# Patient Record
Sex: FEMALE | Race: OTHER | NOT HISPANIC OR LATINO | URBAN - METROPOLITAN AREA
[De-identification: names, ages, dates, MRNs, and addresses within clinical notes are randomized per-mention and may not be internally consistent; named-entity substitution may affect disease eponyms.]

---

## 2018-08-06 ENCOUNTER — EMERGENCY (EMERGENCY)
Facility: HOSPITAL | Age: 33
LOS: 1 days | Discharge: HOME | End: 2018-08-06
Attending: EMERGENCY MEDICINE
Payer: COMMERCIAL

## 2018-08-06 VITALS
WEIGHT: 108.03 LBS | OXYGEN SATURATION: 99 % | SYSTOLIC BLOOD PRESSURE: 126 MMHG | RESPIRATION RATE: 16 BRPM | HEIGHT: 60 IN | HEART RATE: 62 BPM | DIASTOLIC BLOOD PRESSURE: 73 MMHG | TEMPERATURE: 98 F

## 2018-08-06 PROCEDURE — 73620 X-RAY EXAM OF FOOT: CPT | Mod: 26,RT

## 2018-08-06 PROCEDURE — 99283 EMERGENCY DEPT VISIT LOW MDM: CPT

## 2018-08-06 PROCEDURE — 99283 EMERGENCY DEPT VISIT LOW MDM: CPT | Mod: 25

## 2018-08-06 PROCEDURE — 73620 X-RAY EXAM OF FOOT: CPT

## 2018-08-06 RX ORDER — IBUPROFEN 200 MG
600 TABLET ORAL ONCE
Qty: 0 | Refills: 0 | Status: COMPLETED | OUTPATIENT
Start: 2018-08-06 | End: 2018-08-06

## 2018-08-06 RX ADMIN — Medication 600 MILLIGRAM(S): at 00:44

## 2018-08-06 NOTE — ED ADULT TRIAGE NOTE - CHIEF COMPLAINT QUOTE
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 right foot pain and difficulty bearing weight for the past month  pt concerned she may have a fracture

## 2018-08-06 NOTE — ED PROVIDER NOTE - OBJECTIVE STATEMENT
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 Pt stated she felt pain with weight bearing especially during dance. Denies sensory changes or weakness to extremities. Denies fever, chills or recent sickness. Denies head, neck or back pain. No other c/o.

## 2018-08-06 NOTE — ED PROVIDER NOTE - MEDICAL DECISION MAKING DETAILS
Mina: Right foot pain x 1 month primiarly at right first toe and dorsum of foot along first metatarsal. will get xray, pain control, reassess.

## 2018-08-06 NOTE — ED PROVIDER NOTE - PHYSICAL EXAMINATION
NAD, VSS, Afebrile, No spinal tender, No  on palpation, no swelling, warmth or eryth. FUll ROMS of toes. N/V- intact.

## 2018-08-06 NOTE — ED ADULT NURSE NOTE - NSIMPLEMENTINTERV_GEN_ALL_ED
Implemented All Universal Safety Interventions:  [place] to call system. Call bell, personal items and telephone within reach. Instruct patient to call for assistance. Room bathroom lighting operational. Non-slip footwear when patient is off stretcher. Physically safe environment: no spills, clutter or unnecessary equipment. Stretcher in lowest position, wheels locked, appropriate side rails in place.

## 2021-07-12 NOTE — ED PROVIDER NOTE - NS ED NOTE AC HIGH RISK COUNTRIES
Dear Silvia Arenas    After reviewing your responses, I've been able to diagnose you with a urinary tract infection, which is a common infection of the bladder with bacteria.  This is not a sexually transmitted infection, though urinating immediately after intercourse can help prevent infections.  Drinking lots of fluids is also helpful to clear your current infection and prevent the next one.      I have sent a prescription for antibiotics to your pharmacy to treat this infection.    It is important that you take all of your prescribed medication even if your symptoms are improving after a few doses.  Taking all of your medicine helps prevent the symptoms from returning.     If your symptoms worsen, you develop pain in your back or stomach, develop fevers, or are not improving in 5 days, please contact your primary care provider for an appointment or visit any of our convenient Walk-in or Urgent Care Centers to be seen, which can be found on our website here.    Thanks again for choosing us as your health care partner,    AMIE Aldana CNP  
No

## 2021-07-19 ENCOUNTER — APPOINTMENT (OUTPATIENT)
Dept: ANTEPARTUM | Facility: CLINIC | Age: 36
End: 2021-07-19
Payer: SELF-PAY

## 2021-07-19 ENCOUNTER — ASOB RESULT (OUTPATIENT)
Age: 36
End: 2021-07-19

## 2021-07-19 PROBLEM — Z00.00 ENCOUNTER FOR PREVENTIVE HEALTH EXAMINATION: Status: ACTIVE | Noted: 2021-07-19

## 2021-07-19 PROCEDURE — 99072 ADDL SUPL MATRL&STAF TM PHE: CPT

## 2021-07-19 PROCEDURE — 76811 OB US DETAILED SNGL FETUS: CPT

## 2021-11-27 ENCOUNTER — OUTPATIENT (OUTPATIENT)
Dept: OUTPATIENT SERVICES | Facility: HOSPITAL | Age: 36
LOS: 1 days | End: 2021-11-27
Payer: COMMERCIAL

## 2021-11-27 DIAGNOSIS — Z11.52 ENCOUNTER FOR SCREENING FOR COVID-19: ICD-10-CM

## 2021-11-27 PROCEDURE — C9803: CPT

## 2021-11-27 PROCEDURE — U0005: CPT

## 2021-11-27 PROCEDURE — U0003: CPT

## 2021-11-28 LAB — SARS-COV-2 RNA SPEC QL NAA+PROBE: DETECTED

## 2021-11-29 ENCOUNTER — INPATIENT (INPATIENT)
Facility: HOSPITAL | Age: 36
LOS: 2 days | Discharge: HOME | End: 2021-12-02
Attending: OBSTETRICS & GYNECOLOGY | Admitting: OBSTETRICS & GYNECOLOGY
Payer: COMMERCIAL

## 2021-11-29 ENCOUNTER — TRANSCRIPTION ENCOUNTER (OUTPATIENT)
Age: 36
End: 2021-11-29

## 2021-11-29 VITALS — OXYGEN SATURATION: 97 %

## 2021-11-29 DIAGNOSIS — Z98.890 OTHER SPECIFIED POSTPROCEDURAL STATES: Chronic | ICD-10-CM

## 2021-11-29 DIAGNOSIS — Z33.1 PREGNANT STATE, INCIDENTAL: ICD-10-CM

## 2021-11-29 LAB
BASOPHILS # BLD AUTO: 0.02 K/UL — SIGNIFICANT CHANGE UP (ref 0–0.2)
BASOPHILS NFR BLD AUTO: 0.2 % — SIGNIFICANT CHANGE UP (ref 0–2)
BLD GP AB SCN SERPL QL: NEGATIVE — SIGNIFICANT CHANGE UP
EOSINOPHIL # BLD AUTO: 0.02 K/UL — SIGNIFICANT CHANGE UP (ref 0–0.5)
EOSINOPHIL NFR BLD AUTO: 0.2 % — SIGNIFICANT CHANGE UP (ref 0–6)
HCT VFR BLD CALC: 35.9 % — SIGNIFICANT CHANGE UP (ref 34.5–45)
HGB BLD-MCNC: 12.2 G/DL — SIGNIFICANT CHANGE UP (ref 11.5–15.5)
IMM GRANULOCYTES NFR BLD AUTO: 0.5 % — SIGNIFICANT CHANGE UP (ref 0–1.5)
LYMPHOCYTES # BLD AUTO: 1.54 K/UL — SIGNIFICANT CHANGE UP (ref 1–3.3)
LYMPHOCYTES # BLD AUTO: 17.8 % — SIGNIFICANT CHANGE UP (ref 13–44)
MCHC RBC-ENTMCNC: 31 PG — SIGNIFICANT CHANGE UP (ref 27–34)
MCHC RBC-ENTMCNC: 34 GM/DL — SIGNIFICANT CHANGE UP (ref 32–36)
MCV RBC AUTO: 91.3 FL — SIGNIFICANT CHANGE UP (ref 80–100)
MONOCYTES # BLD AUTO: 0.61 K/UL — SIGNIFICANT CHANGE UP (ref 0–0.9)
MONOCYTES NFR BLD AUTO: 7.1 % — SIGNIFICANT CHANGE UP (ref 2–14)
NEUTROPHILS # BLD AUTO: 6.42 K/UL — SIGNIFICANT CHANGE UP (ref 1.8–7.4)
NEUTROPHILS NFR BLD AUTO: 74.2 % — SIGNIFICANT CHANGE UP (ref 43–77)
NRBC # BLD: 0 /100 WBCS — SIGNIFICANT CHANGE UP (ref 0–0)
PLATELET # BLD AUTO: 159 K/UL — SIGNIFICANT CHANGE UP (ref 150–400)
RBC # BLD: 3.93 M/UL — SIGNIFICANT CHANGE UP (ref 3.8–5.2)
RBC # FLD: 12.3 % — SIGNIFICANT CHANGE UP (ref 10.3–14.5)
RH IG SCN BLD-IMP: POSITIVE — SIGNIFICANT CHANGE UP
WBC # BLD: 8.65 K/UL — SIGNIFICANT CHANGE UP (ref 3.8–10.5)
WBC # FLD AUTO: 8.65 K/UL — SIGNIFICANT CHANGE UP (ref 3.8–10.5)

## 2021-11-29 RX ORDER — SODIUM CHLORIDE 9 MG/ML
1000 INJECTION, SOLUTION INTRAVENOUS
Refills: 0 | Status: DISCONTINUED | OUTPATIENT
Start: 2021-11-29 | End: 2021-11-30

## 2021-11-29 RX ORDER — OXYTOCIN 10 UNIT/ML
333.33 VIAL (ML) INJECTION
Qty: 20 | Refills: 0 | Status: DISCONTINUED | OUTPATIENT
Start: 2021-11-29 | End: 2021-11-29

## 2021-11-29 RX ORDER — SODIUM CHLORIDE 9 MG/ML
1000 INJECTION, SOLUTION INTRAVENOUS
Refills: 0 | Status: DISCONTINUED | OUTPATIENT
Start: 2021-11-29 | End: 2021-11-29

## 2021-11-29 RX ORDER — CITRIC ACID/SODIUM CITRATE 300-500 MG
15 SOLUTION, ORAL ORAL EVERY 6 HOURS
Refills: 0 | Status: DISCONTINUED | OUTPATIENT
Start: 2021-11-29 | End: 2021-11-29

## 2021-11-29 RX ORDER — CITRIC ACID/SODIUM CITRATE 300-500 MG
15 SOLUTION, ORAL ORAL EVERY 6 HOURS
Refills: 0 | Status: DISCONTINUED | OUTPATIENT
Start: 2021-11-29 | End: 2021-11-30

## 2021-11-29 RX ORDER — OXYTOCIN 10 UNIT/ML
333.33 VIAL (ML) INJECTION
Qty: 20 | Refills: 0 | Status: DISCONTINUED | OUTPATIENT
Start: 2021-11-29 | End: 2021-11-30

## 2021-11-29 RX ADMIN — SODIUM CHLORIDE 125 MILLILITER(S): 9 INJECTION, SOLUTION INTRAVENOUS at 22:00

## 2021-11-29 NOTE — OB PROVIDER H&P - NSHPPHYSICALEXAM_GEN_ALL_CORE
Objective  – Vital Signs  ICU Vital Signs Last 24 Hrs  T(C): --  T(F): --  HR: 61 (29 Nov 2021 22:32) (55 - 70)  BP: 115/67 (29 Nov 2021 21:39) (115/67 - 115/67)  BP(mean): --  ABP: --  ABP(mean): --  RR: --  SpO2: 98% (29 Nov 2021 22:32) (94% - 100%)    – PE:   CV: RRR  Pulm: breathing comfortably on RA  Abd: gravid, nontender  Extr: moving all extremities with ease  – VE: 1.5/50/-3  – FHT: baseline 140, mod variability, +accels, -decels  – [place]: Uterine irritability   – EFW: 3100g by sono  – Sono: vertex

## 2021-11-29 NOTE — OB PROVIDER H&P - ASSESSMENT
35 yo  F at 40w1d admitted for scheduled IOL for IUGR (AC 7%ile)    Plan  1. Admit to LND. Routine Labs. IVF.  2. IOL w/ PO cytotec -> CB -> epi   3. Fetus: cat 1 tracing. VTX. EFW _g by sono. Continuous EFM. Sono. No concerns.  4. Prenatal issues: IUGR AC 7th %ile   5. GBS _  6. Pain: IV pain meds/epidural PRN    Patient discussed with attending physician, Dr. Arevalo.    Ronda Mancera MD PGY1   35 yo  F at 40w1d admitted for scheduled IOL for IUGR (AC 7%ile)    Plan  1. Admit to LND. Routine Labs. IVF.  2. IOL w/ PO cytotec -> CB -> epi   3. Fetus: cat 1 tracing. VTX. EFW _g by sono. Continuous EFM. Sono. No concerns.  4. Prenatal issues: IUGR AC 7th %ile, dopplers wnl   5. GBS neg  6. Pain: IV pain meds/epidural PRN    Patient discussed with attending physician, Dr. Arevalo.    Ronda Mancera MD PGY1

## 2021-11-29 NOTE — PRE-ANESTHESIA EVALUATION ADULT - NSANTHPMHFT_GEN_ALL_CORE
HPI: 36yoF  at 40w1d gestational age presents for scheduled IOL for IUGR (AC 7%ile). +FM. -LOF. -CTXs. -VB. Pt denies any other concerns. HPI: 36yoF  at 40w1d gestational age presents for scheduled IOL for IUGR (AC 7%ile). +FM. -LOF. -CTXs. -VB. Pt denies any other concerns. She is asymptomatic covid positive this admission.

## 2021-11-29 NOTE — OB PROVIDER H&P - HISTORY OF PRESENT ILLNESS
Admission H&P    Subjective  HPI: 36yoF  at 40w1d gestational age presents for scheduled IOL for IUGR (AC 7%ile). +FM. -LOF. -CTXs. -VB. Pt denies any other concerns.    – PNC: Denies prenatal issues. GBS _.  EFW _g by sono.  – OBHx:   – GynHx: denies  – PMH: denies  – PSH: denies  – Psych: denies   – Social: denies   – Meds: PNV   – Allergies: NKDA  – Will accept blood transfusions? Yes       Admission H&P    Subjective  HPI: 36yoF  at 40w1d gestational age presents for scheduled IOL for IUGR (AC 7%ile), dopplers wnl. +FM. -LOF. -CTXs. -VB. Pt denies any other concerns.    – PNC: Denies prenatal issues. GBS neg.  EFW 3100g by sono.  – OBHx:   – GynHx: denies  – PMH: denies  – PSH: denies  – Psych: denies   – Social: denies   – Meds: PNV   – Allergies: NKDA  – Will accept blood transfusions? Yes

## 2021-11-30 LAB
ALBUMIN SERPL ELPH-MCNC: 3.2 G/DL — LOW (ref 3.3–5)
ALP SERPL-CCNC: 212 U/L — HIGH (ref 40–120)
ALT FLD-CCNC: 33 U/L — SIGNIFICANT CHANGE UP (ref 10–45)
ANION GAP SERPL CALC-SCNC: 9 MMOL/L — SIGNIFICANT CHANGE UP (ref 5–17)
APPEARANCE UR: CLEAR — SIGNIFICANT CHANGE UP
APTT BLD: 31.3 SEC — SIGNIFICANT CHANGE UP (ref 27.5–35.5)
AST SERPL-CCNC: 31 U/L — SIGNIFICANT CHANGE UP (ref 10–40)
BACTERIA # UR AUTO: NEGATIVE — SIGNIFICANT CHANGE UP
BASOPHILS # BLD AUTO: 0.02 K/UL — SIGNIFICANT CHANGE UP (ref 0–0.2)
BASOPHILS NFR BLD AUTO: 0.1 % — SIGNIFICANT CHANGE UP (ref 0–2)
BILIRUB SERPL-MCNC: 0.3 MG/DL — SIGNIFICANT CHANGE UP (ref 0.2–1.2)
BILIRUB UR-MCNC: NEGATIVE — SIGNIFICANT CHANGE UP
BUN SERPL-MCNC: 9 MG/DL — SIGNIFICANT CHANGE UP (ref 7–23)
CALCIUM SERPL-MCNC: 8.8 MG/DL — SIGNIFICANT CHANGE UP (ref 8.4–10.5)
CHLORIDE SERPL-SCNC: 105 MMOL/L — SIGNIFICANT CHANGE UP (ref 96–108)
CO2 SERPL-SCNC: 23 MMOL/L — SIGNIFICANT CHANGE UP (ref 22–31)
COLOR SPEC: SIGNIFICANT CHANGE UP
COVID-19 SPIKE DOMAIN AB INTERP: POSITIVE
COVID-19 SPIKE DOMAIN ANTIBODY RESULT: >250 U/ML — HIGH
CREAT ?TM UR-MCNC: 80 MG/DL — SIGNIFICANT CHANGE UP
CREAT SERPL-MCNC: 0.62 MG/DL — SIGNIFICANT CHANGE UP (ref 0.5–1.3)
DIFF PNL FLD: ABNORMAL
EOSINOPHIL # BLD AUTO: 0.01 K/UL — SIGNIFICANT CHANGE UP (ref 0–0.5)
EOSINOPHIL NFR BLD AUTO: 0.1 % — SIGNIFICANT CHANGE UP (ref 0–6)
EPI CELLS # UR: 3 /HPF — SIGNIFICANT CHANGE UP
FIBRINOGEN PPP-MCNC: 533 MG/DL — HIGH (ref 290–520)
GLUCOSE SERPL-MCNC: 92 MG/DL — SIGNIFICANT CHANGE UP (ref 70–99)
GLUCOSE UR QL: NEGATIVE — SIGNIFICANT CHANGE UP
HCT VFR BLD CALC: 35.5 % — SIGNIFICANT CHANGE UP (ref 34.5–45)
HGB BLD-MCNC: 12.2 G/DL — SIGNIFICANT CHANGE UP (ref 11.5–15.5)
HYALINE CASTS # UR AUTO: 1 /LPF — SIGNIFICANT CHANGE UP (ref 0–2)
IMM GRANULOCYTES NFR BLD AUTO: 0.6 % — SIGNIFICANT CHANGE UP (ref 0–1.5)
INR BLD: 0.83 RATIO — LOW (ref 0.88–1.16)
KETONES UR-MCNC: NEGATIVE — SIGNIFICANT CHANGE UP
LDH SERPL L TO P-CCNC: 158 U/L — SIGNIFICANT CHANGE UP (ref 50–242)
LEUKOCYTE ESTERASE UR-ACNC: NEGATIVE — SIGNIFICANT CHANGE UP
LYMPHOCYTES # BLD AUTO: 0.79 K/UL — LOW (ref 1–3.3)
LYMPHOCYTES # BLD AUTO: 5.5 % — LOW (ref 13–44)
MCHC RBC-ENTMCNC: 31.8 PG — SIGNIFICANT CHANGE UP (ref 27–34)
MCHC RBC-ENTMCNC: 34.4 GM/DL — SIGNIFICANT CHANGE UP (ref 32–36)
MCV RBC AUTO: 92.4 FL — SIGNIFICANT CHANGE UP (ref 80–100)
MONOCYTES # BLD AUTO: 0.76 K/UL — SIGNIFICANT CHANGE UP (ref 0–0.9)
MONOCYTES NFR BLD AUTO: 5.3 % — SIGNIFICANT CHANGE UP (ref 2–14)
NEUTROPHILS # BLD AUTO: 12.6 K/UL — HIGH (ref 1.8–7.4)
NEUTROPHILS NFR BLD AUTO: 88.4 % — HIGH (ref 43–77)
NITRITE UR-MCNC: NEGATIVE — SIGNIFICANT CHANGE UP
NRBC # BLD: 0 /100 WBCS — SIGNIFICANT CHANGE UP (ref 0–0)
PH UR: 6.5 — SIGNIFICANT CHANGE UP (ref 5–8)
PLATELET # BLD AUTO: 125 K/UL — LOW (ref 150–400)
POTASSIUM SERPL-MCNC: 4.1 MMOL/L — SIGNIFICANT CHANGE UP (ref 3.5–5.3)
POTASSIUM SERPL-SCNC: 4.1 MMOL/L — SIGNIFICANT CHANGE UP (ref 3.5–5.3)
PROT ?TM UR-MCNC: 10 MG/DL — SIGNIFICANT CHANGE UP (ref 0–12)
PROT SERPL-MCNC: 5.5 G/DL — LOW (ref 6–8.3)
PROT UR-MCNC: NEGATIVE — SIGNIFICANT CHANGE UP
PROT/CREAT UR-RTO: 0.1 RATIO — SIGNIFICANT CHANGE UP (ref 0–0.2)
PROTHROM AB SERPL-ACNC: 10 SEC — LOW (ref 10.6–13.6)
RBC # BLD: 3.84 M/UL — SIGNIFICANT CHANGE UP (ref 3.8–5.2)
RBC # FLD: 12.6 % — SIGNIFICANT CHANGE UP (ref 10.3–14.5)
RBC CASTS # UR COMP ASSIST: 22 /HPF — HIGH (ref 0–4)
SARS-COV-2 IGG+IGM SERPL QL IA: >250 U/ML — HIGH
SARS-COV-2 IGG+IGM SERPL QL IA: POSITIVE
SODIUM SERPL-SCNC: 137 MMOL/L — SIGNIFICANT CHANGE UP (ref 135–145)
SP GR SPEC: 1.01 — SIGNIFICANT CHANGE UP (ref 1.01–1.02)
T PALLIDUM AB TITR SER: NEGATIVE — SIGNIFICANT CHANGE UP
URATE SERPL-MCNC: 5.1 MG/DL — SIGNIFICANT CHANGE UP (ref 2.5–7)
UROBILINOGEN FLD QL: NEGATIVE — SIGNIFICANT CHANGE UP
WBC # BLD: 14.27 K/UL — HIGH (ref 3.8–10.5)
WBC # FLD AUTO: 14.27 K/UL — HIGH (ref 3.8–10.5)
WBC UR QL: 3 /HPF — SIGNIFICANT CHANGE UP (ref 0–5)

## 2021-11-30 PROCEDURE — 88307 TISSUE EXAM BY PATHOLOGIST: CPT | Mod: 26

## 2021-11-30 PROCEDURE — 93010 ELECTROCARDIOGRAM REPORT: CPT

## 2021-11-30 RX ORDER — IBUPROFEN 200 MG
600 TABLET ORAL EVERY 6 HOURS
Refills: 0 | Status: COMPLETED | OUTPATIENT
Start: 2021-11-30 | End: 2022-10-29

## 2021-11-30 RX ORDER — OXYCODONE HYDROCHLORIDE 5 MG/1
5 TABLET ORAL ONCE
Refills: 0 | Status: DISCONTINUED | OUTPATIENT
Start: 2021-11-30 | End: 2021-12-02

## 2021-11-30 RX ORDER — SODIUM CHLORIDE 9 MG/ML
3 INJECTION INTRAMUSCULAR; INTRAVENOUS; SUBCUTANEOUS EVERY 8 HOURS
Refills: 0 | Status: DISCONTINUED | OUTPATIENT
Start: 2021-11-30 | End: 2021-12-02

## 2021-11-30 RX ORDER — PRAMOXINE HYDROCHLORIDE 150 MG/15G
1 AEROSOL, FOAM RECTAL EVERY 4 HOURS
Refills: 0 | Status: DISCONTINUED | OUTPATIENT
Start: 2021-11-30 | End: 2021-12-02

## 2021-11-30 RX ORDER — OXYTOCIN 10 UNIT/ML
10 VIAL (ML) INJECTION ONCE
Refills: 0 | Status: COMPLETED | OUTPATIENT
Start: 2021-11-30 | End: 2021-11-30

## 2021-11-30 RX ORDER — HYDROCORTISONE 1 %
1 OINTMENT (GRAM) TOPICAL EVERY 6 HOURS
Refills: 0 | Status: DISCONTINUED | OUTPATIENT
Start: 2021-11-30 | End: 2021-12-02

## 2021-11-30 RX ORDER — IBUPROFEN 200 MG
600 TABLET ORAL EVERY 6 HOURS
Refills: 0 | Status: DISCONTINUED | OUTPATIENT
Start: 2021-11-30 | End: 2021-12-02

## 2021-11-30 RX ORDER — CEFAZOLIN SODIUM 1 G
2000 VIAL (EA) INJECTION ONCE
Refills: 0 | Status: COMPLETED | OUTPATIENT
Start: 2021-11-30 | End: 2021-11-30

## 2021-11-30 RX ORDER — OXYTOCIN 10 UNIT/ML
4 VIAL (ML) INJECTION
Qty: 30 | Refills: 0 | Status: DISCONTINUED | OUTPATIENT
Start: 2021-11-30 | End: 2021-11-30

## 2021-11-30 RX ORDER — KETOROLAC TROMETHAMINE 30 MG/ML
30 SYRINGE (ML) INJECTION ONCE
Refills: 0 | Status: DISCONTINUED | OUTPATIENT
Start: 2021-11-30 | End: 2021-11-30

## 2021-11-30 RX ORDER — LANOLIN
1 OINTMENT (GRAM) TOPICAL EVERY 6 HOURS
Refills: 0 | Status: DISCONTINUED | OUTPATIENT
Start: 2021-11-30 | End: 2021-12-02

## 2021-11-30 RX ORDER — OXYTOCIN 10 UNIT/ML
333.33 VIAL (ML) INJECTION
Qty: 20 | Refills: 0 | Status: DISCONTINUED | OUTPATIENT
Start: 2021-11-30 | End: 2021-12-02

## 2021-11-30 RX ORDER — SIMETHICONE 80 MG/1
80 TABLET, CHEWABLE ORAL EVERY 4 HOURS
Refills: 0 | Status: DISCONTINUED | OUTPATIENT
Start: 2021-11-30 | End: 2021-12-02

## 2021-11-30 RX ORDER — OXYCODONE HYDROCHLORIDE 5 MG/1
5 TABLET ORAL
Refills: 0 | Status: DISCONTINUED | OUTPATIENT
Start: 2021-11-30 | End: 2021-12-02

## 2021-11-30 RX ORDER — DIBUCAINE 1 %
1 OINTMENT (GRAM) RECTAL EVERY 6 HOURS
Refills: 0 | Status: DISCONTINUED | OUTPATIENT
Start: 2021-11-30 | End: 2021-12-02

## 2021-11-30 RX ORDER — DIPHENHYDRAMINE HCL 50 MG
25 CAPSULE ORAL EVERY 6 HOURS
Refills: 0 | Status: DISCONTINUED | OUTPATIENT
Start: 2021-11-30 | End: 2021-12-02

## 2021-11-30 RX ORDER — AER TRAVELER 0.5 G/1
1 SOLUTION RECTAL; TOPICAL EVERY 4 HOURS
Refills: 0 | Status: DISCONTINUED | OUTPATIENT
Start: 2021-11-30 | End: 2021-12-02

## 2021-11-30 RX ORDER — BENZOCAINE 10 %
1 GEL (GRAM) MUCOUS MEMBRANE EVERY 6 HOURS
Refills: 0 | Status: DISCONTINUED | OUTPATIENT
Start: 2021-11-30 | End: 2021-12-02

## 2021-11-30 RX ORDER — TETANUS TOXOID, REDUCED DIPHTHERIA TOXOID AND ACELLULAR PERTUSSIS VACCINE, ADSORBED 5; 2.5; 8; 8; 2.5 [IU]/.5ML; [IU]/.5ML; UG/.5ML; UG/.5ML; UG/.5ML
0.5 SUSPENSION INTRAMUSCULAR ONCE
Refills: 0 | Status: DISCONTINUED | OUTPATIENT
Start: 2021-11-30 | End: 2021-12-02

## 2021-11-30 RX ORDER — MAGNESIUM HYDROXIDE 400 MG/1
30 TABLET, CHEWABLE ORAL
Refills: 0 | Status: DISCONTINUED | OUTPATIENT
Start: 2021-11-30 | End: 2021-12-02

## 2021-11-30 RX ORDER — ACETAMINOPHEN 500 MG
975 TABLET ORAL
Refills: 0 | Status: DISCONTINUED | OUTPATIENT
Start: 2021-11-30 | End: 2021-12-02

## 2021-11-30 RX ORDER — SENNA PLUS 8.6 MG/1
2 TABLET ORAL AT BEDTIME
Refills: 0 | Status: DISCONTINUED | OUTPATIENT
Start: 2021-11-30 | End: 2021-12-02

## 2021-11-30 RX ADMIN — SODIUM CHLORIDE 125 MILLILITER(S): 9 INJECTION, SOLUTION INTRAVENOUS at 06:16

## 2021-11-30 RX ADMIN — Medication 975 MILLIGRAM(S): at 23:13

## 2021-11-30 RX ADMIN — Medication 1000 MILLIUNIT(S)/MIN: at 14:49

## 2021-11-30 RX ADMIN — Medication 100 MILLIGRAM(S): at 13:52

## 2021-11-30 RX ADMIN — Medication 975 MILLIGRAM(S): at 23:43

## 2021-11-30 RX ADMIN — SODIUM CHLORIDE 125 MILLILITER(S): 9 INJECTION, SOLUTION INTRAVENOUS at 01:42

## 2021-11-30 RX ADMIN — Medication 975 MILLIGRAM(S): at 18:28

## 2021-11-30 RX ADMIN — Medication 30 MILLIGRAM(S): at 15:00

## 2021-11-30 RX ADMIN — Medication 600 MILLIGRAM(S): at 20:33

## 2021-11-30 RX ADMIN — Medication 10 UNIT(S): at 13:29

## 2021-11-30 RX ADMIN — Medication 4 MILLIUNIT(S)/MIN: at 06:19

## 2021-11-30 RX ADMIN — Medication 975 MILLIGRAM(S): at 19:03

## 2021-11-30 RX ADMIN — Medication 600 MILLIGRAM(S): at 21:03

## 2021-11-30 NOTE — OB PROVIDER LABOR PROGRESS NOTE - ASSESSMENT
BPs mild range  HELLP labs  continue pitocin  peanut ball  expect     kaycee russell
  Plan: 37 y/o  in stable condition  - pt systolic -140s while laying on arm with BP cuff, improved with repositioning, no need for HELLP labs at this time  - significant caput, likely reason for early decelerations at high station  - resuscitation, repositioning PRN  - Cont pitocin  - Continuous EFM, Albert  - Con't IVF    D/W attending physician Dr. Reji Velazquez-Ulises, PGY-1
Patient did 1 push and had a terminal bradycardia to HR 80s  Decision made to proceed with vacuum assisted delivered  See delivery note for details    kaycee russell
CB removed  AROM bloody fluid  pitocin currently at 6u --> increase 4x4  peanut ball

## 2021-11-30 NOTE — OB PROVIDER DELIVERY SUMMARY - NSPROVIDERDELIVERYNOTE_OBGYN_ALL_OB_FT
Spontaneous vacuum assisted vaginal delivery of liveborn infant girl from OA position  2 pulls and no popoffs  Delayed cord clamping performed  Cord gases collected  Placenta delivered intact  Fundus firm  Intact cervix, vaginal walls and sulci  4th deg laceration repaired with vicryl and vicryl rapide  Ancef x 1 given  Excellent hemostasis  Count correct x 2    kaycee russell

## 2021-11-30 NOTE — OB RN DELIVERY SUMMARY - NS_LABORCHARACTER_OBGYN_ALL_OB
Induction of labor-Medicinal/Augmentation of labor/External electronic FM/Meconium staining/Fetal intolerance

## 2021-11-30 NOTE — OB PROVIDER LABOR PROGRESS NOTE - NS_OBIHIFHRDETAILS_OBGYN_ALL_OB_FT
cat 2 tracing with some variables
EFM: 135/mod. variability/+accles/+ early decels  [place]: q2-3 min
cat 2 tracing  occasional late decels but moderate variability
cat 3

## 2021-11-30 NOTE — OB PROVIDER LABOR PROGRESS NOTE - NS_SUBJECTIVE/OBJECTIVE_OBGYN_ALL_OB_FT
PGY1 Labor & Delivery Progress Note     Pt seen & examined at bedside for early decelerations    T(C): 36.6 (11-30-21 @ 09:40), Max: 37.1 (11-30-21 @ 06:05)  HR: 54 (11-30-21 @ 10:32) (41 - 70)  BP: 140/74 (11-30-21 @ 10:07) (112/59 - 160/85)  RR: 18 (11-30-21 @ 09:40) (16 - 18)  SpO2: 98% (11-30-21 @ 10:32) (94% - 100%)
Pt examined for pressure
Pt examined for progress
Pt examined for progress

## 2021-11-30 NOTE — OB RN DELIVERY SUMMARY - NSSELHIDDEN_OBGYN_ALL_OB_FT
[NS_DeliveryAttending1_OBGYN_ALL_OB_FT:[id number]],[NS_DeliveryRN_OBGYN_ALL_OB_FT:[id number]=]

## 2021-11-30 NOTE — CHART NOTE - NSCHARTNOTEFT_GEN_A_CORE
R1 Chart Note    Per Infection Control, pt does not require isolation/contact precautions at this time. Pt tested +COVID PCR on 11/13 is now beyond 10-day isolation window and asymptomatic.  Telephone: [phone]    D/w Dr. Vu PGY-4  LYLE Kaur PGY-1

## 2021-12-01 RX ADMIN — Medication 975 MILLIGRAM(S): at 05:26

## 2021-12-01 RX ADMIN — Medication 975 MILLIGRAM(S): at 18:25

## 2021-12-01 RX ADMIN — Medication 975 MILLIGRAM(S): at 17:50

## 2021-12-01 RX ADMIN — Medication 975 MILLIGRAM(S): at 12:42

## 2021-12-01 RX ADMIN — Medication 600 MILLIGRAM(S): at 16:00

## 2021-12-01 RX ADMIN — Medication 600 MILLIGRAM(S): at 08:25

## 2021-12-01 RX ADMIN — Medication 600 MILLIGRAM(S): at 02:11

## 2021-12-01 RX ADMIN — Medication 600 MILLIGRAM(S): at 15:30

## 2021-12-01 RX ADMIN — Medication 600 MILLIGRAM(S): at 21:03

## 2021-12-01 RX ADMIN — Medication 975 MILLIGRAM(S): at 13:15

## 2021-12-01 RX ADMIN — Medication 600 MILLIGRAM(S): at 08:55

## 2021-12-01 RX ADMIN — Medication 1 TABLET(S): at 12:42

## 2021-12-01 RX ADMIN — Medication 600 MILLIGRAM(S): at 21:32

## 2021-12-01 NOTE — OB RN INTRAOPERATIVE NOTE - NSSELHIDDEN_OBGYN_ALL_OB_FT
[NS_DeliveryAttending1_OBGYN_ALL_OB_FT:[id number]],[NS_DeliveryRN_OBGYN_ALL_OB_FT:[id number]=]

## 2021-12-02 ENCOUNTER — TRANSCRIPTION ENCOUNTER (OUTPATIENT)
Age: 36
End: 2021-12-02

## 2021-12-02 VITALS
RESPIRATION RATE: 18 BRPM | OXYGEN SATURATION: 98 % | HEART RATE: 55 BPM | TEMPERATURE: 99 F | DIASTOLIC BLOOD PRESSURE: 78 MMHG | SYSTOLIC BLOOD PRESSURE: 131 MMHG

## 2021-12-02 PROCEDURE — 84156 ASSAY OF PROTEIN URINE: CPT

## 2021-12-02 PROCEDURE — 84550 ASSAY OF BLOOD/URIC ACID: CPT

## 2021-12-02 PROCEDURE — 85730 THROMBOPLASTIN TIME PARTIAL: CPT

## 2021-12-02 PROCEDURE — 81001 URINALYSIS AUTO W/SCOPE: CPT

## 2021-12-02 PROCEDURE — 59025 FETAL NON-STRESS TEST: CPT

## 2021-12-02 PROCEDURE — 85384 FIBRINOGEN ACTIVITY: CPT

## 2021-12-02 PROCEDURE — 36415 COLL VENOUS BLD VENIPUNCTURE: CPT

## 2021-12-02 PROCEDURE — 83615 LACTATE (LD) (LDH) ENZYME: CPT

## 2021-12-02 PROCEDURE — 80053 COMPREHEN METABOLIC PANEL: CPT

## 2021-12-02 PROCEDURE — 85025 COMPLETE CBC W/AUTO DIFF WBC: CPT

## 2021-12-02 PROCEDURE — 86850 RBC ANTIBODY SCREEN: CPT

## 2021-12-02 PROCEDURE — 59050 FETAL MONITOR W/REPORT: CPT

## 2021-12-02 PROCEDURE — 88307 TISSUE EXAM BY PATHOLOGIST: CPT

## 2021-12-02 PROCEDURE — 86769 SARS-COV-2 COVID-19 ANTIBODY: CPT

## 2021-12-02 PROCEDURE — 93005 ELECTROCARDIOGRAM TRACING: CPT

## 2021-12-02 PROCEDURE — 86780 TREPONEMA PALLIDUM: CPT

## 2021-12-02 PROCEDURE — 86901 BLOOD TYPING SEROLOGIC RH(D): CPT

## 2021-12-02 PROCEDURE — 85610 PROTHROMBIN TIME: CPT

## 2021-12-02 PROCEDURE — 86900 BLOOD TYPING SEROLOGIC ABO: CPT

## 2021-12-02 PROCEDURE — 82570 ASSAY OF URINE CREATININE: CPT

## 2021-12-02 RX ORDER — SENNA PLUS 8.6 MG/1
2 TABLET ORAL
Qty: 0 | Refills: 0 | DISCHARGE
Start: 2021-12-02

## 2021-12-02 RX ORDER — ACETAMINOPHEN 500 MG
3 TABLET ORAL
Qty: 0 | Refills: 0 | DISCHARGE
Start: 2021-12-02

## 2021-12-02 RX ORDER — IBUPROFEN 200 MG
1 TABLET ORAL
Qty: 0 | Refills: 0 | DISCHARGE
Start: 2021-12-02

## 2021-12-02 RX ADMIN — Medication 1 TABLET(S): at 13:30

## 2021-12-02 RX ADMIN — Medication 975 MILLIGRAM(S): at 14:00

## 2021-12-02 RX ADMIN — Medication 975 MILLIGRAM(S): at 05:32

## 2021-12-02 RX ADMIN — Medication 975 MILLIGRAM(S): at 13:29

## 2021-12-02 RX ADMIN — Medication 975 MILLIGRAM(S): at 06:21

## 2021-12-02 NOTE — DISCHARGE NOTE OB - NS MD DC FALL RISK RISK
For information on Fall & Injury Prevention, visit: [email or web address] OR  [email or web address] OR  https://www.cdc.gov/steadi/patient.html

## 2021-12-02 NOTE — PROGRESS NOTE ADULT - SUBJECTIVE AND OBJECTIVE BOX
OB Progress Note: VAVD PPD#2    S: 37yo PPD#2 s/p VAVD. Patient feels well. Pain is well controlled. She is tolerating a regular diet and passing flatus. She is voiding spontaneously, and ambulating without difficulty. Denies CP/SOB. Denies lightheadedness/dizziness. Denies N/V.    O:  Vitals:   Vital Signs Last 24 Hrs  T(C): 36.2 (02 Dec 2021 05:07), Max: 36.9 (01 Dec 2021 08:55)  T(F): 97.1 (02 Dec 2021 05:07), Max: 98.4 (01 Dec 2021 08:55)  HR: 62 (02 Dec 2021 05:07) (62 - 72)  BP: 133/79 (02 Dec 2021 05:07) (120/76 - 133/79)  BP(mean): --  RR: 18 (02 Dec 2021 05:07) (18 - 18)  SpO2: 97% (02 Dec 2021 05:07) (97% - 99%)    MEDICATIONS  (STANDING):  acetaminophen     Tablet .. 975 milliGRAM(s) Oral <User Schedule>  diphtheria/tetanus/pertussis (acellular) Vaccine (ADAcel) 0.5 milliLiter(s) IntraMuscular once  ibuprofen  Tablet. 600 milliGRAM(s) Oral every 6 hours  oxytocin Infusion 333.333 milliUNIT(s)/Min (1000 mL/Hr) IV Continuous <Continuous>  prenatal multivitamin 1 Tablet(s) Oral daily  senna 2 Tablet(s) Oral at bedtime  sodium chloride 0.9% lock flush 3 milliLiter(s) IV Push every 8 hours    MEDICATIONS  (PRN):  benzocaine 20%/menthol 0.5% Spray 1 Spray(s) Topical every 6 hours PRN for Perineal discomfort  dibucaine 1% Ointment 1 Application(s) Topical every 6 hours PRN Perineal discomfort  diphenhydrAMINE 25 milliGRAM(s) Oral every 6 hours PRN Pruritus  hydrocortisone 1% Cream 1 Application(s) Topical every 6 hours PRN Moderate Pain (4-6)  lanolin Ointment 1 Application(s) Topical every 6 hours PRN nipple soreness  magnesium hydroxide Suspension 30 milliLiter(s) Oral two times a day PRN Constipation  oxyCODONE    IR 5 milliGRAM(s) Oral every 3 hours PRN Moderate to Severe Pain (4-10)  oxyCODONE    IR 5 milliGRAM(s) Oral once PRN Moderate to Severe Pain (4-10)  pramoxine 1%/zinc 5% Cream 1 Application(s) Topical every 4 hours PRN Moderate Pain (4-6)  simethicone 80 milliGRAM(s) Chew every 4 hours PRN Gas  witch hazel Pads 1 Application(s) Topical every 4 hours PRN Perineal discomfort      Labs:  Blood type: AB Positive  Rubella IgG: RPR: Negative                          12.2   14.27<H> >-----------< 125<L>    ( 11-30 @ 11:33 )             35.5                        12.2   8.65 >-----------< 159    ( 11-29 @ 22:04 )             35.9    11-30-21 @ 11:33      137  |  105  |  9   ----------------------------<  92  4.1   |  23  |  0.62        Ca    8.8      30 Nov 2021 11:33    TPro  5.5<L>  /  Alb  3.2<L>  /  TBili  0.3  /  DBili  x   /  AST  31  /  ALT  33  /  AlkPhos  212<H>  11-30-21 @ 11:33          Physical Exam:  General: NAD  Abdomen: soft, non-tender, non-distended, fundus firm  Vaginal: Lochia wnl  Extremities: No erythema/edema  
OB Progress Note: VAVD PPD#1    S: 35yo  PPD#1 s/p VAVD. Patient feels well. Pain is well controlled. She is tolerating a regular diet and passing flatus. She is voiding spontaneously, and ambulating without difficulty. Denies CP/SOB. Denies lightheadedness/dizziness. Denies N/V.    O:  Vitals:  Vital Signs Last 24 Hrs  T(C): 36.9 (01 Dec 2021 05:20), Max: 36.9 (30 Nov 2021 12:40)  T(F): 98.4 (01 Dec 2021 05:20), Max: 98.42 (30 Nov 2021 12:40)  HR: 57 (01 Dec 2021 05:20) (42 - 74)  BP: 110/70 (01 Dec 2021 05:20) (102/50 - 159/74)  BP(mean): --  RR: 18 (01 Dec 2021 05:20) (18 - 18)  SpO2: 98% (01 Dec 2021 05:20) (90% - 100%)    MEDICATIONS  (STANDING):  acetaminophen     Tablet .. 975 milliGRAM(s) Oral <User Schedule>  diphtheria/tetanus/pertussis (acellular) Vaccine (ADAcel) 0.5 milliLiter(s) IntraMuscular once  ibuprofen  Tablet. 600 milliGRAM(s) Oral every 6 hours  oxytocin Infusion 333.333 milliUNIT(s)/Min (1000 mL/Hr) IV Continuous <Continuous>  prenatal multivitamin 1 Tablet(s) Oral daily  senna 2 Tablet(s) Oral at bedtime  sodium chloride 0.9% lock flush 3 milliLiter(s) IV Push every 8 hours      Labs:  Blood type: AB Positive  Rubella IgG: RPR: Negative                          12.2   14.27<H> >-----------< 125<L>    ( 11-30 @ 11:33 )             35.5                        12.2   8.65 >-----------< 159    ( 11-29 @ 22:04 )             35.9    11-30-21 @ 11:33      137  |  105  |  9   ----------------------------<  92  4.1   |  23  |  0.62        Ca    8.8      30 Nov 2021 11:33    TPro  5.5<L>  /  Alb  3.2<L>  /  TBili  0.3  /  DBili  x   /  AST  31  /  ALT  33  /  AlkPhos  212<H>  11-30-21 @ 11:33          Physical Exam:  General: NAD  Abdomen: soft, non-tender, non-distended, fundus firm  Vaginal: Lochia wnl  Extremities: No erythema/edema

## 2021-12-02 NOTE — DISCHARGE NOTE OB - CARE PROVIDER_API CALL
Dimple Mendoza)  [facility] Obstetrics [facility] Gynecology  [address] [place] [address], [address]  [place] [address]  Phone: [phone]  Fax: [phone]  Follow Up Time:

## 2021-12-02 NOTE — PROGRESS NOTE ADULT - ASSESSMENT
A/P: 35yo PPD#1 s/p VAVD c/b 4th degree perineal laceration.  Patient is stable and doing well post-partum.   - perineal care: sitz baths, ice packs, rest per routine  - Pain well controlled, continue current pain regimen  - Increase ambulation, SCDs when not ambulating  - Continue regular diet    Radha Kramer, PGY1  
  A/P: 35yo PPD#2 s/p VAVD c/b 4th degree laceration.  Patient is stable and doing well post-partum.    - Pain well controlled, continue current pain regimen  - Increase ambulation, SCDs when not ambulating  - Continue regular diet  - Discharge planning     Radha Kramer, PGY1

## 2021-12-02 NOTE — PROGRESS NOTE ADULT - ATTENDING COMMENTS
agree with above note  cont present mgmt  t krystin russell
I agree with the resident's note above.    Patient is doing well. Pain is well controlled. Tolerating regular diet, ambulating, and voiding.  Vital signs stable    Plan:  Reg diet  Ambulating  Pain control  Routine postpartum care    gchow

## 2021-12-02 NOTE — DISCHARGE NOTE OB - PATIENT PORTAL LINK FT
You can access the FollowMyHealth Patient Portal offered by [facility] by registering at the following website: [email or web address] By joining [email or web address] FollowMyHealth portal, you will also be able to view your health information using other applications (apps) compatible with our system.

## 2021-12-02 NOTE — DISCHARGE NOTE OB - CARE PLAN
Principal Discharge DX:	Vacuum-assisted vaginal delivery  Assessment and plan of treatment:	reg diet, ambulating, pain control  Secondary Diagnosis:	Fourth degree perineal laceration  Assessment and plan of treatment:	stool softeners   1

## 2021-12-02 NOTE — DISCHARGE NOTE OB - MEDICATION SUMMARY - MEDICATIONS TO TAKE
I will START or STAY ON the medications listed below when I get home from the hospital:    acetaminophen 325 mg oral tablet  -- 3 tab(s) by mouth   -- Indication: For Pain    ibuprofen 600 mg oral tablet  -- 1 tab(s) by mouth every 6 hours  -- Indication: For Pain    Prenatal Multivitamins with Folic Acid 1 mg oral tablet  -- 1 tab(s) by mouth once a day  -- Indication: For vitamin    senna oral tablet  -- 2 tab(s) by mouth once a day (at bedtime)  -- Indication: For stool softener

## 2021-12-02 NOTE — DISCHARGE NOTE OB - MATERIALS PROVIDED
[facility] [place] Screening Program/[place]  Immunization Record/Breastfeeding Mother’s Support Group Information/Guide to Postpartum Care/Breastfeeding Guide and Packet/Birth Certificate Instructions

## 2022-08-31 NOTE — DISCHARGE NOTE OB - INCREASED VAGINAL BLEEDING OR LARGE CLOTS (SATURATING A PAD AN HOUR)
WATCHING CHOLESTEROL INTAKE  COMPLIANT WITH MEDICATION  NO CONCERNS    - CONTINUE TO MONITOR DIETARY CHOL INTAKE  - CONTINUE CURRENT MEDICATION  - ENCOURAGED PHYSICAL ACTIVITY Statement Selected